# Patient Record
Sex: FEMALE | Race: WHITE | NOT HISPANIC OR LATINO | Employment: FULL TIME
[De-identification: names, ages, dates, MRNs, and addresses within clinical notes are randomized per-mention and may not be internally consistent; named-entity substitution may affect disease eponyms.]

---

## 2021-06-27 ENCOUNTER — HEALTH MAINTENANCE LETTER (OUTPATIENT)
Age: 21
End: 2021-06-27

## 2021-10-17 ENCOUNTER — HEALTH MAINTENANCE LETTER (OUTPATIENT)
Age: 21
End: 2021-10-17

## 2022-07-20 ENCOUNTER — LAB REQUISITION (OUTPATIENT)
Dept: LAB | Facility: CLINIC | Age: 22
End: 2022-07-20

## 2022-07-20 DIAGNOSIS — N89.8 OTHER SPECIFIED NONINFLAMMATORY DISORDERS OF VAGINA: ICD-10-CM

## 2022-07-20 PROCEDURE — 87591 N.GONORRHOEAE DNA AMP PROB: CPT | Performed by: OBSTETRICS & GYNECOLOGY

## 2022-07-21 LAB
C TRACH DNA SPEC QL PROBE+SIG AMP: NEGATIVE
N GONORRHOEA DNA SPEC QL NAA+PROBE: NEGATIVE

## 2022-10-03 ENCOUNTER — HEALTH MAINTENANCE LETTER (OUTPATIENT)
Age: 22
End: 2022-10-03

## 2023-02-11 ENCOUNTER — HEALTH MAINTENANCE LETTER (OUTPATIENT)
Age: 23
End: 2023-02-11

## 2023-11-08 ENCOUNTER — OFFICE VISIT (OUTPATIENT)
Dept: FAMILY MEDICINE | Facility: CLINIC | Age: 23
End: 2023-11-08
Payer: COMMERCIAL

## 2023-11-08 VITALS
TEMPERATURE: 98.8 F | WEIGHT: 138 LBS | RESPIRATION RATE: 16 BRPM | HEART RATE: 90 BPM | SYSTOLIC BLOOD PRESSURE: 110 MMHG | BODY MASS INDEX: 23.69 KG/M2 | OXYGEN SATURATION: 98 % | DIASTOLIC BLOOD PRESSURE: 71 MMHG

## 2023-11-08 DIAGNOSIS — B37.31 YEAST INFECTION OF THE VAGINA: Primary | ICD-10-CM

## 2023-11-08 DIAGNOSIS — J02.9 SORE THROAT: ICD-10-CM

## 2023-11-08 DIAGNOSIS — N89.8 VAGINAL IRRITATION: ICD-10-CM

## 2023-11-08 LAB
CLUE CELLS: ABNORMAL
DEPRECATED S PYO AG THROAT QL EIA: NEGATIVE
GROUP A STREP BY PCR: NOT DETECTED
TRICHOMONAS, WET PREP: ABNORMAL
WBC'S/HIGH POWER FIELD, WET PREP: ABNORMAL
YEAST, WET PREP: PRESENT

## 2023-11-08 PROCEDURE — 99204 OFFICE O/P NEW MOD 45 MIN: CPT | Performed by: NURSE PRACTITIONER

## 2023-11-08 PROCEDURE — 87651 STREP A DNA AMP PROBE: CPT | Performed by: NURSE PRACTITIONER

## 2023-11-08 PROCEDURE — 87491 CHLMYD TRACH DNA AMP PROBE: CPT | Performed by: NURSE PRACTITIONER

## 2023-11-08 PROCEDURE — 87591 N.GONORRHOEAE DNA AMP PROB: CPT | Performed by: NURSE PRACTITIONER

## 2023-11-08 PROCEDURE — 87635 SARS-COV-2 COVID-19 AMP PRB: CPT | Performed by: NURSE PRACTITIONER

## 2023-11-08 PROCEDURE — 87210 SMEAR WET MOUNT SALINE/INK: CPT | Performed by: NURSE PRACTITIONER

## 2023-11-08 RX ORDER — FLUCONAZOLE 150 MG/1
150 TABLET ORAL ONCE
Qty: 2 TABLET | Refills: 0 | Status: SHIPPED | OUTPATIENT
Start: 2023-11-08 | End: 2023-11-08

## 2023-11-08 RX ORDER — NORGESTIMATE AND ETHINYL ESTRADIOL 7DAYSX3 28
KIT ORAL
COMMUNITY

## 2023-11-08 NOTE — PROGRESS NOTES
Assessment & Plan     Sore throat    - Streptococcus A Rapid Screen w/Reflex to PCR - Clinic Collect  - Group A Streptococcus PCR Throat Swab    Vaginal irritation    - Wet prep  - Chlamydia & Gonorrhea by PCR, [facility] - Clinic Collect  - Symptomatic COVID-19 Virus (Coronavirus) by PCR Nose    Yeast infection of the vagina    - fluconazole (DIFLUCAN) 150 MG tablet  Dispense: 2 tablet; Refill: 0     Patient with sore throat associated with sweats and chills for the last 24 hours.  Negative strep test.  Had an ongoing cough for 1 month that was largely resolved just prior to onset of the symptoms.  She did end up on a Augmentin prescription for sinusitis associated with this which is the likely cause of yeast infection today.    Diflucan as above.  Viral sore throat treatment -Tylenol, ibuprofen, salt water gargles.  Recheck in a week if not much better with either issue.          Return in about 1 week (around 11/15/2023) for If no better.    Hortencia Costello, CNP  [facility]    Meghan Bolanos is a 23 year old female who presents to clinic today for the following health issues:  Chief Complaint   Patient presents with    Cough     Has had cough for abt 1 month  sore throat chills  started yesterday again      HPI    Cough for about a month with new sore throat and chills starting yesterday.  Wondering about strep.    Mostly having clear rhinorrhea and sputum for a month.  Went to UC about a month ago.  Rec'd augmentin 1 month ago for sinus inf which improved.  Then it came back. Went back to PCP and has been doing tea/steam.  Had inhaler prescribed for bronchitis which she didn't use.  Cough mostly gone.      Yesterday - chills, sweats, throat pain.  This morning pus on tonsils - better with pain.  ++ Headache.      Second issue is 1 week of itching and irritation.  Increased redness on external vaginal.          Review of Systems  See HPI          Objective    /71    Pulse 90   Temp 98.8  F (37.1  C) (Oral)   Resp 16   Wt 62.6 kg (138 lb)   SpO2 98%   BMI 23.69 kg/m    Physical Exam  Constitutional:       General: She is not in acute distress.     Appearance: She is well-developed.   HENT:      Mouth/Throat:      Mouth: Mucous membranes are moist.      Pharynx: Posterior oropharyngeal erythema present. No oropharyngeal exudate.      Tonsils: No tonsillar exudate. 1+ on the right. 1+ on the left.   Eyes:      General:         Right eye: No discharge.         Left eye: No discharge.      Conjunctiva/sclera: Conjunctivae normal.   Pulmonary:      Effort: Pulmonary effort is normal.      Breath sounds: Normal breath sounds. No wheezing.   Musculoskeletal:         General: Normal range of motion.   Skin:     General: Skin is warm and dry.      Capillary Refill: Capillary refill takes less than 2 seconds.   Neurological:      Mental Status: She is alert and oriented to person, place, and time.   Psychiatric:         Mood and Affect: Mood normal.         Behavior: Behavior normal.         Thought Content: Thought content normal.         Judgment: Judgment normal.            Results for orders placed or performed in visit on 11/08/23 (from the past 24 hour(s))   Streptococcus A Rapid Screen w/Reflex to PCR - Clinic Collect    Specimen: Throat; Swab   Result Value Ref Range    Group A Strep antigen Negative Negative   Wet prep    Specimen: Vagina; Swab   Result Value Ref Range    Trichomonas Absent Absent    Yeast Present (A) Absent    Clue Cells Absent Absent    WBCs/high power field 2+ (A) None

## 2023-11-08 NOTE — PATIENT INSTRUCTIONS
No strep today.  We will call later if her strep culture is positive.    Treat with Tylenol or ibuprofen for now, push fluids, recheck in 1 week if not much better, sooner if worsening.  See handout for details.  Watch for COVID results in your MyChart most likely tomorrow.    You have a yeast infection today.    Diflucan 1 tablet now second tablet in 3 days if still having itching or irritation or do not feel completely better.  Recheck in 1 week if symptoms have not fully resolved after 2 doses.

## 2023-11-09 LAB
C TRACH DNA SPEC QL PROBE+SIG AMP: NEGATIVE
N GONORRHOEA DNA SPEC QL NAA+PROBE: NEGATIVE
SARS-COV-2 RNA RESP QL NAA+PROBE: NEGATIVE

## 2023-12-12 ENCOUNTER — OFFICE VISIT (OUTPATIENT)
Dept: FAMILY MEDICINE | Facility: CLINIC | Age: 23
End: 2023-12-12
Payer: COMMERCIAL

## 2023-12-12 VITALS
WEIGHT: 143.3 LBS | HEART RATE: 59 BPM | TEMPERATURE: 98.5 F | RESPIRATION RATE: 18 BRPM | OXYGEN SATURATION: 100 % | DIASTOLIC BLOOD PRESSURE: 71 MMHG | SYSTOLIC BLOOD PRESSURE: 122 MMHG | BODY MASS INDEX: 24.6 KG/M2

## 2023-12-12 DIAGNOSIS — H92.01 RIGHT EAR PAIN: Primary | ICD-10-CM

## 2023-12-12 DIAGNOSIS — Z11.3 SCREEN FOR STD (SEXUALLY TRANSMITTED DISEASE): ICD-10-CM

## 2023-12-12 DIAGNOSIS — J03.90 TONSILLITIS: ICD-10-CM

## 2023-12-12 PROCEDURE — 87591 N.GONORRHOEAE DNA AMP PROB: CPT | Performed by: FAMILY MEDICINE

## 2023-12-12 PROCEDURE — 87491 CHLMYD TRACH DNA AMP PROBE: CPT | Performed by: FAMILY MEDICINE

## 2023-12-12 PROCEDURE — 99213 OFFICE O/P EST LOW 20 MIN: CPT | Performed by: FAMILY MEDICINE

## 2023-12-12 RX ORDER — FLUCONAZOLE 150 MG/1
150 TABLET ORAL ONCE
Qty: 2 TABLET | Refills: 0 | Status: SHIPPED | OUTPATIENT
Start: 2023-12-12 | End: 2023-12-12

## 2023-12-12 RX ORDER — CEPHALEXIN 500 MG/1
500 CAPSULE ORAL 3 TIMES DAILY
Qty: 21 CAPSULE | Refills: 0 | Status: SHIPPED | OUTPATIENT
Start: 2023-12-12

## 2023-12-12 NOTE — PROGRESS NOTES
(H92.01) Right ear pain  (primary encounter diagnosis)  Comment: Exam of TMs WNL today.  Plan:     (J03.90) Tonsillitis  Comment:   Some swelling of the right tonsil, no exudate.  Plan: cephALEXin (KEFLEX) 500 MG capsule, fluconazole        (DIFLUCAN) 150 MG tablet        Fill Rx if symptoms worsen.    (Z11.3) Screen for STD (sexually transmitted disease)  Comment: By patient request, asymptomatic.  Plan: Chlamydia & Gonorrhea by PCR, [facility] -         Clinic Collect              CHIEF COMPLAINT    Right ear pain.      HISTORY    A 23-year-old woman who works at a [employer] with young children.    She has had frequent URIs.  She had a painful right tonsil for about 5 days.  In the last day or 2 she has had right ear pain and the tonsil pain has subsided.    She also requested STD screening, just for chlamydia/gonorrhea.  She says she is asymptomatic but would just like to be reassured.      REVIEW OF SYSTEMS    No fevers or chills.  Currently no wheezing or marked chest congestion.  No nausea, vomiting, diarrhea.      EXAM  /71 (BP Location: Left arm, Patient Position: Sitting, Cuff Size: Adult Regular)   Pulse 59   Temp 98.5  F (36.9  C) (Oral)   Resp 18   Wt 65 kg (143 lb 4.8 oz)   LMP 11/30/2023   SpO2 100%   BMI 24.60 kg/m      TMs were normal bilaterally.  Ear canals normal.  Pharynx shows right tonsil to be a bit larger than left, no exudate or significant redness.  Minimally enlarged anterior cervical nodes.  No cough or labored breathing.

## 2023-12-13 LAB
C TRACH DNA SPEC QL PROBE+SIG AMP: NEGATIVE
N GONORRHOEA DNA SPEC QL NAA+PROBE: NEGATIVE

## 2024-01-23 ENCOUNTER — OFFICE VISIT (OUTPATIENT)
Dept: URGENT CARE | Facility: URGENT CARE | Age: 24
End: 2024-01-23
Payer: COMMERCIAL

## 2024-01-23 VITALS
DIASTOLIC BLOOD PRESSURE: 80 MMHG | SYSTOLIC BLOOD PRESSURE: 140 MMHG | OXYGEN SATURATION: 98 % | TEMPERATURE: 97.9 F | HEIGHT: 64 IN | HEART RATE: 79 BPM | WEIGHT: 140 LBS | BODY MASS INDEX: 23.9 KG/M2

## 2024-01-23 DIAGNOSIS — H10.13 ALLERGIC CONJUNCTIVITIS, BILATERAL: Primary | ICD-10-CM

## 2024-01-23 DIAGNOSIS — Z11.3 SCREEN FOR STD (SEXUALLY TRANSMITTED DISEASE): ICD-10-CM

## 2024-01-23 PROCEDURE — 99213 OFFICE O/P EST LOW 20 MIN: CPT | Performed by: PHYSICIAN ASSISTANT

## 2024-01-23 PROCEDURE — 87591 N.GONORRHOEAE DNA AMP PROB: CPT | Performed by: PHYSICIAN ASSISTANT

## 2024-01-23 PROCEDURE — 87491 CHLMYD TRACH DNA AMP PROBE: CPT | Performed by: PHYSICIAN ASSISTANT

## 2024-01-23 RX ORDER — CETIRIZINE HYDROCHLORIDE 10 MG/1
10 TABLET ORAL DAILY
Qty: 30 TABLET | Refills: 0 | Status: SHIPPED | OUTPATIENT
Start: 2024-01-23

## 2024-01-23 RX ORDER — NEOMYCIN SULFATE, POLYMYXIN B SULFATE, AND DEXAMETHASONE 3.5; 10000; 1 MG/G; [USP'U]/G; MG/G
0.5 OINTMENT OPHTHALMIC 4 TIMES DAILY
COMMUNITY

## 2024-01-23 RX ORDER — KETOTIFEN FUMARATE 0.35 MG/ML
1 SOLUTION/ DROPS OPHTHALMIC 2 TIMES DAILY
Qty: 10 ML | Refills: 0 | Status: SHIPPED | OUTPATIENT
Start: 2024-01-23

## 2024-01-23 NOTE — PROGRESS NOTES
Allergic conjunctivitis, bilateral  - cetirizine (ZYRTEC) 10 MG tablet; Take 1 tablet (10 mg) by mouth daily  - ketotifen fumarate 0.035% 0.035 % SOLN ophthalmic solution; Place 1 drop into both eyes 2 times daily    Screen for STD (sexually transmitted disease)  - NEISSERIA GONORRHOEA PCR  - CHLAMYDIA TRACHOMATIS PCR       Pinkeye: Care Instructions  Overview     Pinkeye is redness and swelling of the eye surface and the conjunctiva (the lining of the eyelid and the covering of the white part of the eye). Pinkeye is also called conjunctivitis. Pinkeye is often caused by infection with bacteria or a virus. Dry air, allergies, smoke, and chemicals are other common causes.  Pinkeye often gets better on its own in 7 to 10 days. Antibiotics only help if the pinkeye is caused by bacteria. Pinkeye caused by infection spreads easily. If an allergy or chemical is causing pinkeye, it will not go away unless you can avoid whatever is causing it.  Follow-up care is a key part of your treatment and safety. Be sure to make and go to all appointments, and call your doctor if you are having problems. It's also a good idea to know your test results and keep a list of the medicines you take.  How can you care for yourself at home?  Wash your hands often. Always wash them before and after you treat pinkeye or touch your eyes or face.  Use moist cotton or a clean, wet cloth to remove crust. Wipe from the inside corner of the eye to the outside. Use a clean part of the cloth for each wipe.  Put cold or warm wet cloths on your eye a few times a day if the eye hurts.  Do not wear contact lenses or eye makeup until the pinkeye is gone. Throw away any eye makeup you were using when you got pinkeye. Clean your contacts and storage case. If you wear disposable contacts, use a new pair when your eye has cleared and it is safe to wear contacts again.  If the doctor gave you antibiotic ointment or eyedrops, use them as directed. Use the  "medicine for as long as instructed, even if your eye starts looking better soon. Keep the bottle tip clean, and do not let it touch the eye area.  To put in eyedrops or ointment:  Tilt your head back, and pull your lower eyelid down with one finger.  Drop or squirt the medicine inside the lower lid.  Close your eye for 30 to 60 seconds to let the drops or ointment move around.  Do not touch the ointment or dropper tip to your eyelashes or any other surface.  Do not share towels, pillows, or washcloths while you have pinkeye.  When should you call for help?   Call your doctor now or seek immediate medical care if:    You have pain in your eye, not just irritation on the surface.     You have a change in vision or loss of vision.     You have an increase in discharge from the eye.     Your eye has not started to improve or begins to get worse within 48 hours after you start using antibiotics.     Pinkeye lasts longer than 7 days.   Watch closely for changes in your health, and be sure to contact your doctor if you have any problems.  Where can you learn more?  Go to https://[email or web address].net/patiented  Enter Y392 in the search box to learn more about \"Pinkeye: Care Instructions.\"  Current as of: June 6, 2023               Content Version: 13.8    [id number] [employer].   Care instructions adapted under license by your healthcare professional. If you have questions about a medical condition or this instruction, always ask your healthcare professional. [employer] disclaims any warranty or liability for your use of this information.             Patient was advised to return to clinic for reevaluation (either UC or PCP) if symptoms do not improve in 7 days. Patient educated on red flag symptoms and asked to go directly to the ED if these symptoms present themselves.       Miguel Bruno PA-C  [facility]    Subjective   23 year old who presents to clinic today for the " following health issues:    Urgent Care and Eye Problem       HPI     Eye(s) Problem  Onset/Duration: Pt in clinic to have eval for non resolved eye discharge, drainage, redness, swelling and irritation. Patient has been taking neomycin polymixin dexamethasone. Patient has also used lubricating eye drops. This has not improved the symptoms   Description:   Location: Bilateral but it seems to switch from right an left.   Pain: Feels sore - Patient states that eyes feel heavy   Redness: YES  Accompanying Signs & Symptoms:  Discharge/mattering: YES  Swelling: Patient states that they seem to be swollen in the morning   Visual changes: No  Fever: No  Nasal Congestion: YES  Bothered by bright lights: No  History:  Trauma: No  Foreign body exposure: No  Wearing contacts: No  Precipitating or alleviating factors: Patient wonders if maybe her symptoms could be due to mold in her living environment. 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 She has been on multiple rounds of oral antibiotics 4 times times in the fall.    Therapies tried and outcome: See above    Patient is concerned about screening for STDs and yeast infections. Patient states that she has yeast infections frequently. She frequently has vaginal infections.     Review of Systems   Review of Systems   See HPI    Objective    Temp: 97.9  F (36.6  C) Temp src: Temporal BP: (!) 140/80 Pulse: 79     SpO2: 98 %       Physical Exam   Physical Exam  Constitutional:       General: She is not in acute distress.     Appearance: Normal appearance. She is normal weight. She is not ill-appearing, toxic-appearing or diaphoretic.   HENT:      Head: Normocephalic and atraumatic.   Eyes:      General: Lids are normal. Allergic shiner present.         Right eye: No foreign body, discharge or hordeolum.         Left eye: No foreign body, discharge or hordeolum.      Extraocular Movements: Extraocular movements intact.      Right eye:  Normal extraocular motion.      Left eye: Normal extraocular motion.      Conjunctiva/sclera:      Right eye: Right conjunctiva is injected. No chemosis, exudate or hemorrhage.     Left eye: Left conjunctiva is injected. No chemosis, exudate or hemorrhage.  Cardiovascular:      Rate and Rhythm: Normal rate.      Pulses: Normal pulses.   Pulmonary:      Effort: Pulmonary effort is normal. No respiratory distress.   Neurological:      General: No focal deficit present.      Mental Status: She is alert and oriented to person, place, and time. Mental status is at baseline.      Gait: Gait normal.   Psychiatric:         Mood and Affect: Mood normal.         Behavior: Behavior normal.         Thought Content: Thought content normal.         Judgment: Judgment normal.          No results found for this or any previous visit (from the past 24 hour(s)).

## 2024-01-24 LAB
C TRACH DNA SPEC QL NAA+PROBE: NEGATIVE
N GONORRHOEA DNA SPEC QL NAA+PROBE: NEGATIVE

## 2024-03-09 ENCOUNTER — HEALTH MAINTENANCE LETTER (OUTPATIENT)
Age: 24
End: 2024-03-09

## 2024-03-20 ENCOUNTER — LAB REQUISITION (OUTPATIENT)
Dept: LAB | Facility: CLINIC | Age: 24
End: 2024-03-20

## 2024-03-20 DIAGNOSIS — Z12.4 ENCOUNTER FOR SCREENING FOR MALIGNANT NEOPLASM OF CERVIX: ICD-10-CM

## 2024-03-20 PROCEDURE — G0145 SCR C/V CYTO,THINLAYER,RESCR: HCPCS | Performed by: OBSTETRICS & GYNECOLOGY

## 2024-03-25 LAB
BKR LAB AP GYN ADEQUACY: NORMAL
BKR LAB AP GYN INTERPRETATION: NORMAL
BKR LAB AP HPV REFLEX: NORMAL
BKR LAB AP LMP: NORMAL
BKR LAB AP PREVIOUS ABNL DX: NORMAL
BKR LAB AP PREVIOUS ABNORMAL: NORMAL
PATH REPORT.COMMENTS IMP SPEC: NORMAL
PATH REPORT.COMMENTS IMP SPEC: NORMAL
PATH REPORT.RELEVANT HX SPEC: NORMAL

## 2024-08-07 ENCOUNTER — OFFICE VISIT (OUTPATIENT)
Dept: URGENT CARE | Facility: URGENT CARE | Age: 24
End: 2024-08-07
Payer: COMMERCIAL

## 2024-08-07 VITALS
DIASTOLIC BLOOD PRESSURE: 71 MMHG | TEMPERATURE: 97.5 F | HEART RATE: 59 BPM | SYSTOLIC BLOOD PRESSURE: 106 MMHG | OXYGEN SATURATION: 98 %

## 2024-08-07 DIAGNOSIS — L02.411 ABSCESS OF AXILLA, RIGHT: ICD-10-CM

## 2024-08-07 DIAGNOSIS — L73.9 FOLLICULITIS: Primary | ICD-10-CM

## 2024-08-07 PROCEDURE — 99213 OFFICE O/P EST LOW 20 MIN: CPT | Mod: 25 | Performed by: NURSE PRACTITIONER

## 2024-08-07 PROCEDURE — 10060 I&D ABSCESS SIMPLE/SINGLE: CPT | Performed by: NURSE PRACTITIONER

## 2024-08-07 PROCEDURE — 87077 CULTURE AEROBIC IDENTIFY: CPT | Performed by: NURSE PRACTITIONER

## 2024-08-07 PROCEDURE — 87186 SC STD MICRODIL/AGAR DIL: CPT | Performed by: NURSE PRACTITIONER

## 2024-08-07 PROCEDURE — 87070 CULTURE OTHR SPECIMN AEROBIC: CPT | Performed by: NURSE PRACTITIONER

## 2024-08-07 RX ORDER — MUPIROCIN 20 MG/G
OINTMENT TOPICAL 2 TIMES DAILY
Qty: 22 G | Refills: 0 | Status: SHIPPED | OUTPATIENT
Start: 2024-08-07 | End: 2024-08-14

## 2024-08-07 RX ORDER — SULFAMETHOXAZOLE/TRIMETHOPRIM 800-160 MG
1 TABLET ORAL 2 TIMES DAILY
Qty: 14 TABLET | Refills: 0 | Status: SHIPPED | OUTPATIENT
Start: 2024-08-07 | End: 2024-08-14

## 2024-08-07 NOTE — PATIENT INSTRUCTIONS
Bactrim and Bactroban for abscess folliculitis  Wound culture pending, we call if change in antibiotic needed  Epsom salt warm soapy water soaks  Derm for follow-up  Reevaluation immediately if fevers rapid worsening redness streaking vomiting

## 2024-08-07 NOTE — PROGRESS NOTES
Chief Complaint   Patient presents with    Urgent Care     - Started Last Week  - Cyst under left armpit  - Has seen Derm for similar issues was given steroid inj  - Cyst has returned  - Here today for treatment      SUBJECTIVE:  Cici Jorge is a 23 year old female presenting with a painful red swollen lump to her right armpit worsening over the last week.  The dermatologist injected it with a steroid about a week ago.  Also has left axilla tender red pustules.  She declines fever sweats chills nausea vomiting or history of this.    No past medical history on file.  Current Outpatient Medications   Medication Sig Dispense Refill    mupirocin (BACTROBAN) 2 % external ointment Apply topically 2 times daily for 7 days 22 g 0    sulfamethoxazole-trimethoprim (BACTRIM DS) 800-160 MG tablet Take 1 tablet by mouth 2 times daily for 7 days 14 tablet 0    cephALEXin (KEFLEX) 500 MG capsule Take 1 capsule (500 mg) by mouth 3 times daily (Patient not taking: Reported on 8/7/2024) 21 capsule 0    cetirizine (ZYRTEC) 10 MG tablet Take 1 tablet (10 mg) by mouth daily (Patient not taking: Reported on 8/7/2024) 30 tablet 0    hydrOXYzine pamoate (VISTARIL) 25 MG capsule [HYDROXYZINE PAMOATE (VISTARIL) 25 MG CAPSULE] Take 1 capsule (25 mg total) by mouth 3 (three) times a day as needed for itching. (Patient not taking: Reported on 8/7/2024) 20 capsule 0    ketotifen fumarate 0.035% 0.035 % SOLN ophthalmic solution Place 1 drop into both eyes 2 times daily (Patient not taking: Reported on 8/7/2024) 10 mL 0    neomycin-polymyxin-dexAMETHasone (MAXITROL) 3.5-88303-4.1 ophthalmic ointment 0.5 inches 4 times daily (Patient not taking: Reported on 8/7/2024)      norgestim-eth estrad triphasic (TRI-SPRINTEC) 0.18/0.215/0.25 MG-35 MCG tablet TAKE 1 TABLET BY MOUTH EVERY DAY Orally Once a day for 90 days (Patient not taking: Reported on 8/7/2024)       No current facility-administered medications for this visit.     Social History      Tobacco Use    Smoking status: Never    Smokeless tobacco: Never   Substance Use Topics    Alcohol use: Not on file     No Known Allergies    Review of Systems  All systems negative except for those listed above in HPI.    OBJECTIVE:   /71   Pulse 59   Temp 97.5  F (36.4  C) (Temporal)   SpO2 98%     Physical Exam  Vitals reviewed.   Constitutional:       Appearance: Normal appearance.   HENT:      Head: Normocephalic and atraumatic.   Cardiovascular:      Rate and Rhythm: Normal rate.      Pulses: Normal pulses.   Pulmonary:      Effort: Pulmonary effort is normal.   Musculoskeletal:         General: Normal range of motion.   Skin:     General: Skin is warm and dry.      Findings: Erythema, lesion and rash present.      Comments: Right axilla with 3 x 3 cm semifirm erythematous tender abscess.   Neurological:      General: No focal deficit present.      Mental Status: She is alert and oriented to person, place, and time.   Psychiatric:         Mood and Affect: Mood normal.         Behavior: Behavior normal.       ASSESSMENT:    ICD-10-CM    1. Folliculitis  L73.9 sulfamethoxazole-trimethoprim (BACTRIM DS) 800-160 MG tablet     mupirocin (BACTROBAN) 2 % external ointment     Swab Aerobic Bacterial Culture Routine Without Gram Stain      2. Abscess of axilla, right  L02.411 sulfamethoxazole-trimethoprim (BACTRIM DS) 800-160 MG tablet     Swab Aerobic Bacterial Culture Routine Without Gram Stain     DRAIN SKIN ABSCESS SIMPLE/SINGLE          PLAN:     Procedure: Area cleansed with Betadine and alcohol prep pads.  2 cc of lidocaine 2% with epi instilled for good anesthesia.  An 11 blade was used to create a 1 cm incision center of abscess.  Moderate amount of serosanguineous purulent discharge removed and cultured.  Abscess was explored and broken up.  Area bandaged and patient tolerated well.    Bactrim and Bactroban for abscess folliculitis  Wound culture pending, we call if change in antibiotic  needed  Epsom salt warm soapy water soaks  Derm for follow-up  Reevaluation immediately if fevers rapid worsening redness streaking vomiting    Follow up with primary care provider with any problems, questions or concerns or if symptoms worsen or fail to improve. Patient agreed to plan and verbalized understanding.    Mel Mishra, [facility]

## 2024-08-09 LAB — BACTERIA FLD CULT: ABNORMAL

## 2025-03-16 ENCOUNTER — HEALTH MAINTENANCE LETTER (OUTPATIENT)
Age: 25
End: 2025-03-16